# Patient Record
Sex: FEMALE | Race: WHITE
[De-identification: names, ages, dates, MRNs, and addresses within clinical notes are randomized per-mention and may not be internally consistent; named-entity substitution may affect disease eponyms.]

---

## 2017-10-12 ENCOUNTER — HOSPITAL ENCOUNTER (OUTPATIENT)
Dept: HOSPITAL 19 - MC.RAD | Age: 62
End: 2017-10-12
Payer: COMMERCIAL

## 2017-10-12 DIAGNOSIS — Z12.31: Primary | ICD-10-CM

## 2019-12-17 ENCOUNTER — HOSPITAL ENCOUNTER (OUTPATIENT)
Dept: HOSPITAL 19 - MC.RAD | Age: 64
End: 2019-12-17
Payer: COMMERCIAL

## 2019-12-17 DIAGNOSIS — Z12.31: Primary | ICD-10-CM

## 2020-12-22 ENCOUNTER — HOSPITAL ENCOUNTER (OUTPATIENT)
Dept: HOSPITAL 19 - MC.RAD | Age: 65
End: 2020-12-22
Payer: COMMERCIAL

## 2020-12-22 DIAGNOSIS — Z12.31: Primary | ICD-10-CM

## 2021-06-24 ENCOUNTER — HOSPITAL ENCOUNTER (EMERGENCY)
Dept: HOSPITAL 19 - COL.ER | Age: 66
Discharge: HOME | End: 2021-06-24
Attending: PERSONAL EMERGENCY RESPONSE ATTENDANT
Payer: COMMERCIAL

## 2021-06-24 VITALS — WEIGHT: 120.15 LBS | HEIGHT: 64.02 IN | BODY MASS INDEX: 20.51 KG/M2

## 2021-06-24 VITALS — DIASTOLIC BLOOD PRESSURE: 74 MMHG | SYSTOLIC BLOOD PRESSURE: 136 MMHG | HEART RATE: 78 BPM

## 2021-06-24 VITALS — TEMPERATURE: 97.5 F

## 2021-06-24 DIAGNOSIS — E10.40: ICD-10-CM

## 2021-06-24 DIAGNOSIS — E10.649: ICD-10-CM

## 2021-06-24 DIAGNOSIS — D53.9: ICD-10-CM

## 2021-06-24 DIAGNOSIS — I48.91: Primary | ICD-10-CM

## 2021-06-24 DIAGNOSIS — Z79.4: ICD-10-CM

## 2021-06-24 DIAGNOSIS — G40.909: ICD-10-CM

## 2021-06-24 LAB
ACETONE SERPL-MCNC: NEGATIVE MG/DL
ALBUMIN SERPL-MCNC: 3.5 GM/DL (ref 3.5–5)
ALP SERPL-CCNC: 159 U/L (ref 50–136)
ALT SERPL-CCNC: 28 U/L (ref 4–34)
ANION GAP SERPL CALC-SCNC: 5 MMOL/L (ref 7–16)
AST SERPL-CCNC: 42 U/L (ref 15–37)
BASOPHILS # BLD: 0 10*3/UL (ref 0–0.2)
BASOPHILS NFR BLD AUTO: 0.2 % (ref 0–2)
BILIRUB SERPL-MCNC: 0.1 MG/DL (ref 0–1)
BUN SERPL-MCNC: 8 MG/DL (ref 7–17)
CALCIUM SERPL-MCNC: 8.5 MG/DL (ref 8.4–10.2)
CHLORIDE SERPL-SCNC: 94 MMOL/L (ref 98–107)
CO2 SERPL-SCNC: 28 MMOL/L (ref 22–30)
CREAT SERPL-SCNC: 0.27 UMOL/L (ref 0.52–1.25)
EOSINOPHIL # BLD: 0 10*3/UL (ref 0–0.7)
EOSINOPHIL NFR BLD: 0.2 % (ref 0–4)
ERYTHROCYTE [DISTWIDTH] IN BLOOD BY AUTOMATED COUNT: 15.3 % (ref 11.5–14.5)
GLUCOSE SERPL-MCNC: 314 MG/DL (ref 74–106)
GLUCOSE UR QL STRIP.AUTO: (no result)
GRANULOCYTES # BLD AUTO: 84.1 % (ref 42.2–75.2)
HCT VFR BLD AUTO: 28.2 % (ref 37–47)
HGB BLD-MCNC: 9.7 G/DL (ref 12.5–16)
LIPASE SERPL-CCNC: 47 U/L (ref 23–300)
LYMPHOCYTES # BLD: 0.4 10*3/UL (ref 1.2–3.4)
LYMPHOCYTES NFR BLD: 7.5 % (ref 20–51)
MCH RBC QN AUTO: 31 PG (ref 27–31)
MCHC RBC AUTO-ENTMCNC: 34 G/DL (ref 33–37)
MCV RBC AUTO: 89 FL (ref 80–100)
MONOCYTES # BLD: 0.4 10*3/UL (ref 0.1–0.6)
MONOCYTES NFR BLD AUTO: 7.6 % (ref 1.7–9.3)
NEUTROPHILS # BLD: 4.3 10*3/UL (ref 1.4–6.5)
PH UR STRIP.AUTO: 7 [PH] (ref 5–8)
PLATELET # BLD AUTO: 445 K/MM3 (ref 130–400)
PMV BLD AUTO: 7.9 FL (ref 7.4–10.4)
POTASSIUM SERPL-SCNC: 4.4 MMOL/L (ref 3.4–5)
PROT SERPL-MCNC: 6.4 GM/DL (ref 6.4–8.2)
RBC # BLD AUTO: 3.18 M/MM3 (ref 4.1–5.3)
RBC # UR: (no result) /HPF
SODIUM SERPL-SCNC: 127 MMOL/L (ref 137–145)
SP GR UR STRIP.AUTO: 1.01 (ref 1–1.03)
SQUAMOUS # URNS: (no result) /HPF
TROPONIN I SERPL-MCNC: < 0.012 NG/ML (ref 0–0.04)
URINE LEUKOCYTE ESTERASE: (no result)
URN COLLECT METHOD CLASS: (no result)

## 2021-08-15 ENCOUNTER — HOSPITAL ENCOUNTER (OUTPATIENT)
Dept: HOSPITAL 19 - COL.ER | Age: 66
Setting detail: OBSERVATION
LOS: 1 days | Discharge: HOME | End: 2021-08-16
Attending: STUDENT IN AN ORGANIZED HEALTH CARE EDUCATION/TRAINING PROGRAM | Admitting: STUDENT IN AN ORGANIZED HEALTH CARE EDUCATION/TRAINING PROGRAM
Payer: COMMERCIAL

## 2021-08-15 VITALS — HEIGHT: 60 IN | BODY MASS INDEX: 17.7 KG/M2 | WEIGHT: 90.17 LBS

## 2021-08-15 DIAGNOSIS — Z79.4: ICD-10-CM

## 2021-08-15 DIAGNOSIS — Z79.890: ICD-10-CM

## 2021-08-15 DIAGNOSIS — E87.1: ICD-10-CM

## 2021-08-15 DIAGNOSIS — R62.51: ICD-10-CM

## 2021-08-15 DIAGNOSIS — E03.9: ICD-10-CM

## 2021-08-15 DIAGNOSIS — Z87.891: ICD-10-CM

## 2021-08-15 DIAGNOSIS — R53.81: ICD-10-CM

## 2021-08-15 DIAGNOSIS — E87.8: ICD-10-CM

## 2021-08-15 DIAGNOSIS — E11.65: Primary | ICD-10-CM

## 2021-08-15 DIAGNOSIS — E78.5: ICD-10-CM

## 2021-08-15 DIAGNOSIS — Z90.49: ICD-10-CM

## 2021-08-15 DIAGNOSIS — G40.909: ICD-10-CM

## 2021-08-15 DIAGNOSIS — E83.42: ICD-10-CM

## 2021-08-15 DIAGNOSIS — E87.6: ICD-10-CM

## 2021-08-15 DIAGNOSIS — Z20.822: ICD-10-CM

## 2021-08-15 DIAGNOSIS — D72.819: ICD-10-CM

## 2021-08-15 DIAGNOSIS — G93.49: ICD-10-CM

## 2021-08-15 DIAGNOSIS — E11.40: ICD-10-CM

## 2021-08-15 DIAGNOSIS — Z90.89: ICD-10-CM

## 2021-08-15 DIAGNOSIS — Z79.899: ICD-10-CM

## 2021-08-15 DIAGNOSIS — Z96.641: ICD-10-CM

## 2021-08-15 LAB
ALBUMIN SERPL-MCNC: 4.1 GM/DL (ref 3.5–5)
ALP SERPL-CCNC: 164 U/L (ref 50–136)
ALT SERPL-CCNC: 20 U/L (ref 4–34)
ANION GAP SERPL CALC-SCNC: 7 MMOL/L (ref 7–16)
AST SERPL-CCNC: 41 U/L (ref 15–37)
BASOPHILS # BLD: 0 10*3/UL (ref 0–0.2)
BASOPHILS NFR BLD AUTO: 0.3 % (ref 0–2)
BILIRUB SERPL-MCNC: 0.3 MG/DL (ref 0–1)
BUN SERPL-MCNC: 5 MG/DL (ref 7–17)
CALCIUM SERPL-MCNC: 8.8 MG/DL (ref 8.4–10.2)
CHLORIDE SERPL-SCNC: 91 MMOL/L (ref 98–107)
CO2 SERPL-SCNC: 27 MMOL/L (ref 22–30)
CREAT SERPL-SCNC: 0.33 UMOL/L (ref 0.52–1.25)
EOSINOPHIL # BLD: 0 10*3/UL (ref 0–0.7)
EOSINOPHIL NFR BLD: 0 % (ref 0–4)
ERYTHROCYTE [DISTWIDTH] IN BLOOD BY AUTOMATED COUNT: 14 % (ref 11.5–14.5)
GLUCOSE SERPL-MCNC: 117 MG/DL (ref 74–106)
GRANULOCYTES # BLD AUTO: 78.9 % (ref 42.2–75.2)
HCT VFR BLD AUTO: 38.8 % (ref 37–47)
HGB BLD-MCNC: 12.9 G/DL (ref 12.5–16)
KETONES UR STRIP.AUTO-MCNC: (no result) MG/DL
LYMPHOCYTES # BLD: 0.4 10*3/UL (ref 1.2–3.4)
LYMPHOCYTES NFR BLD: 12.2 % (ref 20–51)
MCH RBC QN AUTO: 31 PG (ref 27–31)
MCHC RBC AUTO-ENTMCNC: 33 G/DL (ref 33–37)
MCV RBC AUTO: 93 FL (ref 80–100)
MONOCYTES # BLD: 0.3 10*3/UL (ref 0.1–0.6)
MONOCYTES NFR BLD AUTO: 8.6 % (ref 1.7–9.3)
NEUTROPHILS # BLD: 2.8 10*3/UL (ref 1.4–6.5)
OSMOLALITY UR: 337 OSM/KG (ref 50–1200)
PH UR STRIP.AUTO: 8 [PH] (ref 5–8)
PLATELET # BLD AUTO: 255 K/MM3 (ref 130–400)
PMV BLD AUTO: 8.4 FL (ref 7.4–10.4)
POTASSIUM SERPL-SCNC: 3.8 MMOL/L (ref 3.4–5)
PROT SERPL-MCNC: 7.3 GM/DL (ref 6.4–8.2)
RBC # BLD AUTO: 4.17 M/MM3 (ref 4.1–5.3)
RBC # UR: (no result) /HPF
SODIUM SERPL-SCNC: 125 MMOL/L (ref 137–145)
SP GR UR STRIP.AUTO: 1.01 (ref 1–1.03)
SQUAMOUS # URNS: (no result) /HPF
TROPONIN I SERPL-MCNC: < 0.012 NG/ML (ref 0–0.04)
URN COLLECT METHOD CLASS: (no result)

## 2021-08-15 PROCEDURE — G0378 HOSPITAL OBSERVATION PER HR: HCPCS

## 2021-08-16 VITALS — HEART RATE: 92 BPM | SYSTOLIC BLOOD PRESSURE: 150 MMHG | DIASTOLIC BLOOD PRESSURE: 79 MMHG | TEMPERATURE: 98.4 F

## 2021-08-16 VITALS — SYSTOLIC BLOOD PRESSURE: 133 MMHG | TEMPERATURE: 98.4 F | HEART RATE: 96 BPM | DIASTOLIC BLOOD PRESSURE: 73 MMHG

## 2021-08-16 VITALS — TEMPERATURE: 98.7 F | DIASTOLIC BLOOD PRESSURE: 87 MMHG | SYSTOLIC BLOOD PRESSURE: 141 MMHG | HEART RATE: 71 BPM

## 2021-08-16 VITALS — SYSTOLIC BLOOD PRESSURE: 134 MMHG | DIASTOLIC BLOOD PRESSURE: 77 MMHG | HEART RATE: 76 BPM | TEMPERATURE: 98 F

## 2021-08-16 LAB
ANION GAP SERPL CALC-SCNC: 4 MMOL/L (ref 7–16)
ANION GAP SERPL CALC-SCNC: 6 MMOL/L (ref 7–16)
BASOPHILS # BLD: 0 10*3/UL (ref 0–0.2)
BASOPHILS NFR BLD AUTO: 0.2 % (ref 0–2)
BUN SERPL-MCNC: 5 MG/DL (ref 7–17)
BUN SERPL-MCNC: 5 MG/DL (ref 7–17)
CALCIUM SERPL-MCNC: 8.3 MG/DL (ref 8.4–10.2)
CALCIUM SERPL-MCNC: 8.5 MG/DL (ref 8.4–10.2)
CHLORIDE SERPL-SCNC: 93 MMOL/L (ref 98–107)
CHLORIDE SERPL-SCNC: 95 MMOL/L (ref 98–107)
CO2 SERPL-SCNC: 25 MMOL/L (ref 22–30)
CO2 SERPL-SCNC: 26 MMOL/L (ref 22–30)
CREAT SERPL-SCNC: 0.3 UMOL/L (ref 0.52–1.25)
CREAT SERPL-SCNC: 0.3 UMOL/L (ref 0.52–1.25)
EOSINOPHIL # BLD: 0 10*3/UL (ref 0–0.7)
EOSINOPHIL NFR BLD: 0.4 % (ref 0–4)
ERYTHROCYTE [DISTWIDTH] IN BLOOD BY AUTOMATED COUNT: 13.8 % (ref 11.5–14.5)
GLUCOSE SERPL-MCNC: 252 MG/DL (ref 74–106)
GLUCOSE SERPL-MCNC: 267 MG/DL (ref 74–106)
GRANULOCYTES # BLD AUTO: 73.6 % (ref 42.2–75.2)
HCT VFR BLD AUTO: 32.6 % (ref 37–47)
HGB BLD-MCNC: 10.9 G/DL (ref 12.5–16)
LYMPHOCYTES # BLD: 0.7 10*3/UL (ref 1.2–3.4)
LYMPHOCYTES NFR BLD: 14.9 % (ref 20–51)
MCH RBC QN AUTO: 31 PG (ref 27–31)
MCHC RBC AUTO-ENTMCNC: 33 G/DL (ref 33–37)
MCV RBC AUTO: 92 FL (ref 80–100)
MONOCYTES # BLD: 0.5 10*3/UL (ref 0.1–0.6)
MONOCYTES NFR BLD AUTO: 10.5 % (ref 1.7–9.3)
NEUTROPHILS # BLD: 3.6 10*3/UL (ref 1.4–6.5)
PLATELET # BLD AUTO: 227 K/MM3 (ref 130–400)
PMV BLD AUTO: 8.7 FL (ref 7.4–10.4)
POTASSIUM SERPL-SCNC: 3.3 MMOL/L (ref 3.4–5)
POTASSIUM SERPL-SCNC: 3.8 MMOL/L (ref 3.4–5)
RBC # BLD AUTO: 3.53 M/MM3 (ref 4.1–5.3)
SODIUM SERPL-SCNC: 124 MMOL/L (ref 137–145)
SODIUM SERPL-SCNC: 125 MMOL/L (ref 137–145)

## 2021-08-16 NOTE — NUR
met with patient to discuss discharge plan. Patient lives alone
within her home here in [place]. Patient has a sister (Zulema) who lives
in [place] [phone]. Patient's PCP is Dr. Mendoza and utilizes
both [facility] and [facility] for pharmacy needs. Patient received a total
hip in June and was participating in outpatient PT. Patient suffered a fall
last week resulting in a broken wrist. Dr. Max completed surgery on R
wrist last Wednesday 08/11. Patient states she is independent on ADL's and
uses a walker around the house. Patient states when she leaves the house, she
uses a crutch but with the broken wrist that is "hard to do". Patient states
that her drive way has a steep incline that she can't get up with a walker but
can get up with the crutch. Patient states that she has a DPO[employer] established
and has a copy of it at home. Patient has a  student coming to her
house multiple times a week to care for her cat's and is utilizing [facility] for
other needs as well. "She took my walker around to different stores to get a
basket put on". Patient plans to discharge home.
 
*Discharge plan: Home; pending PT/OT rec's*

## 2021-08-16 NOTE — NUR
met with patient to follow up on home health recommendations
from PT/OT. Patient is insistent on not having home health. Reason's provided
are: "i have cats for one and two i'm doing therapy as an outpatient". Social
worker educated patient on what home health involves and offered patient
[email or web address] list of home health agencies. Patient had no desire to review
list.  contacted PCP office to inform.
 
*Discharge Plan:Home*

## 2021-08-16 NOTE — NUR
Patient doing well throughout the day, would like to discharge this afternoon.
Contacted hospitalist with blood sugars, Megan CUELLAR in to see patient.
Patient Denies pain throughout the day, has been up ambulating in room with
steady gait and cane. Patient denies needs at this time. Discharge education
provided to patient. Educated on when to call provider and medication changes.
All questions answered. Patient out by wheelchair with surgical staff INT to
left forarm discontinued, catheter tip intact.

## 2021-08-16 NOTE — NUR
Patient came up on the floor from ER at 0615. Assessment completed, alert and
oriented. denies pain or SOB. She have right arm sling from thumb surgery last
08/11. VS are stable. She have scar in her right hip from her surgery last
June. She is on seizure precautions. Bed alarm is on and call light is within
reach. Swallow her pills good. No further complains. Will give report to
incoming RN.